# Patient Record
(demographics unavailable — no encounter records)

---

## 2024-11-07 NOTE — HISTORY OF PRESENT ILLNESS
[FreeTextEntry1] :   MG CEKIC Oct 20 1955   Language: English Date of First visit: 10/29/2024 Accompanied by: self Contact info: -Refe-rring Provider/PCP: Dr. Johnson  Fax: [phone]    CC/ Problem List: Elevated PSA  =============================================================================== FIRST VISIT / Summary: Very pleasant 69 year old M here for LUTS, mostly frequency and urgency. With coffee, around 4-5x daily and without coffee (tea) he goes less maybe 3-4 times. Drinks about 2L daily.  He states had cystoscopy and also a prostate biopsy around 20 years ago. He says all was normal.   ------------------------------------------------------------------------------------------- INTERVAL VISITS:   ===============================================================================   PMH: Medications: tamsulosin FHx: Father  age 69 from prostate cancer. Great uncle also prostate cancer.  SocHx:    PSH: hernia  ROS: Review of Systems is as per HPI unless otherwise denoted below   =============================================================================== DATA: LABS (SELECTED):--------------------------------------------------------------------------------------------------- 2/15/2021 PSA 3.7 2022 PSA  4.33 10/3/2022 PSA 3.980 2024 PSA 4.85  RADS:-------------------------------------------------------------------------------------------------------------------     PATHOLOGY/CYTOLOGY:-------------------------------------------------------------------------------------------     VOIDING STUDIES: ----------------------------------------------------------------------------------------------------     STONE STUDIES: (Analysis/LLSA)----------------------------------------------------------------------------------     PROCEDURES: -----------------------------------------------------------------------------------------------       =============================================================================== PHYSICAL EXAM:    FOCUSED: ----------------------------------------------------------------------------------------------------------------  Date: 10/29/2024  Chaperone: Natasha JONES  Prostate: mildly enlarged approx 35g, nontender, symmetric, no nodules     ======================================================================================= DISCUSSION: ======================================================================================= ASSESSMENT and PLAN   1. LUTS - avoid coffee - 2L minimum fluids - continue tamsulosin   2. Elevated PSA - strong family history - rec MRI and biopsy. Last biopsy was over 20 years ago and PSA now higher   =======================================================================================  4g Thank you for allowing me to assist in the care of your patient. Should you have any questions please do not hesitate to reach out to me.     Bebo Argueta MD                                                    [facility] Physician [facility] for [facility]   [place] Office:  [facility], [address] [place] [address] T: [phone] F: [phone]   [place] Office:   [facility] [address], [address] [place] [address] T: [phone] F: [phone]

## 2024-11-07 NOTE — HISTORY OF PRESENT ILLNESS
[FreeTextEntry1] :   MG CEKIC Oct 20 1955   Language: English Date of First visit: 10/29/2024 Accompanied by: self Contact info: -Refe-rring Provider/PCP: Dr. Johnson  Fax: [phone]    CC/ Problem List: Elevated PSA  =============================================================================== FIRST VISIT / Summary: Very pleasant 69 year old M here for LUTS, mostly frequency and urgency. With coffee, around 4-5x daily and without coffee (tea) he goes less maybe 3-4 times. Drinks about 2L daily.  He states had cystoscopy and also a prostate biopsy around 20 years ago. He says all was normal.   ------------------------------------------------------------------------------------------- INTERVAL VISITS:   ===============================================================================   PMH: Medications: tamsulosin FHx: Father  age 69 from prostate cancer. Great uncle also prostate cancer.  SocHx:    PSH: hernia  ROS: Review of Systems is as per HPI unless otherwise denoted below   =============================================================================== DATA: LABS (SELECTED):--------------------------------------------------------------------------------------------------- 2/15/2021 PSA 3.7 2022 PSA  4.33 10/3/2022 PSA 3.980 2024 PSA 4.85  RADS:-------------------------------------------------------------------------------------------------------------------     PATHOLOGY/CYTOLOGY:-------------------------------------------------------------------------------------------     VOIDING STUDIES: ----------------------------------------------------------------------------------------------------     STONE STUDIES: (Analysis/LLSA)----------------------------------------------------------------------------------     PROCEDURES: -----------------------------------------------------------------------------------------------       =============================================================================== PHYSICAL EXAM:    FOCUSED: ----------------------------------------------------------------------------------------------------------------  Date: 10/29/2024  Chaperone: Natasha JONES  Prostate: mildly enlarged approx 35g, nontender, symmetric, no nodules     ======================================================================================= DISCUSSION: ======================================================================================= ASSESSMENT and PLAN   1. LUTS - avoid coffee - 2L minimum fluids - continue tamsulosin   2. Elevated PSA - strong family history - rec MRI and biopsy. Last biopsy was over 20 years ago and PSA now higher   =======================================================================================  4g Thank you for allowing me to assist in the care of your patient. Should you have any questions please do not hesitate to reach out to me.     Bebo Argueta MD                                                    [facility] Physician [facility] for U[facility]   [place] Office:  [facility], [address] [place] [address] T: [phone] F: [phone]   [place] Office:   [facility] [address], [address] [place] [address] T: [phone] F: [phone]

## 2024-11-07 NOTE — HISTORY OF PRESENT ILLNESS
[FreeTextEntry1] :   MG CEKIC Oct 20 1955   Language: English Date of First visit: 10/29/2024 Accompanied by: self Contact info: -Refe-rring Provider/PCP: Dr. Johnson  Fax: [phone]    CC/ Problem List: Elevated PSA  =============================================================================== FIRST VISIT / Summary: Very pleasant 69 year old M here for LUTS, mostly frequency and urgency. With coffee, around 4-5x daily and without coffee (tea) he goes less maybe 3-4 times. Drinks about 2L daily.  He states had cystoscopy and also a prostate biopsy around 20 years ago. He says all was normal.   ------------------------------------------------------------------------------------------- INTERVAL VISITS:   ===============================================================================   PMH: Medications: tamsulosin FHx: Father  age 69 from prostate cancer. Great uncle also prostate cancer.  SocHx:    PSH: hernia  ROS: Review of Systems is as per HPI unless otherwise denoted below   =============================================================================== DATA: LABS (SELECTED):--------------------------------------------------------------------------------------------------- 2/15/2021 PSA 3.7 2022 PSA  4.33 10/3/2022 PSA 3.980 2024 PSA 4.85  RADS:-------------------------------------------------------------------------------------------------------------------     PATHOLOGY/CYTOLOGY:-------------------------------------------------------------------------------------------     VOIDING STUDIES: ----------------------------------------------------------------------------------------------------     STONE STUDIES: (Analysis/LLSA)----------------------------------------------------------------------------------     PROCEDURES: -----------------------------------------------------------------------------------------------       =============================================================================== PHYSICAL EXAM:    FOCUSED: ----------------------------------------------------------------------------------------------------------------  Date: 10/29/2024  Chaperone: Natasha JONES  Prostate: mildly enlarged approx 35g, nontender, symmetric, no nodules     ======================================================================================= DISCUSSION: ======================================================================================= ASSESSMENT and PLAN   1. LUTS - avoid coffee - 2L minimum fluids - continue tamsulosin   2. Elevated PSA - strong family history - rec MRI and biopsy. Last biopsy was over 20 years ago and PSA now higher   =======================================================================================  4g Thank you for allowing me to assist in the care of your patient. Should you have any questions please do not hesitate to reach out to me.     Bebo Argueta MD                                                    [facility] Physician [facility] for Urology   [place] Office:  [facility], [address] [place] [address] T: [phone] F: [phone]   [place] Office:   [facility] [address], [address] [place] [address] T: [phone] F: [phone]